# Patient Record
Sex: MALE | Race: WHITE | URBAN - METROPOLITAN AREA
[De-identification: names, ages, dates, MRNs, and addresses within clinical notes are randomized per-mention and may not be internally consistent; named-entity substitution may affect disease eponyms.]

---

## 2017-11-29 ENCOUNTER — OFFICE VISIT (OUTPATIENT)
Dept: FAMILY MEDICINE | Facility: CLINIC | Age: 57
End: 2017-11-29
Payer: OTHER MISCELLANEOUS

## 2017-11-29 VITALS
BODY MASS INDEX: 26.11 KG/M2 | WEIGHT: 197 LBS | HEIGHT: 73 IN | OXYGEN SATURATION: 99 % | HEART RATE: 66 BPM | SYSTOLIC BLOOD PRESSURE: 122 MMHG | DIASTOLIC BLOOD PRESSURE: 76 MMHG | TEMPERATURE: 97.6 F

## 2017-11-29 DIAGNOSIS — L30.9 DERMATITIS: Primary | ICD-10-CM

## 2017-11-29 PROCEDURE — 99202 OFFICE O/P NEW SF 15 MIN: CPT | Performed by: FAMILY MEDICINE

## 2017-11-29 RX ORDER — TRIAMCINOLONE ACETONIDE 5 MG/G
CREAM TOPICAL
Qty: 30 G | Refills: 0 | Status: SHIPPED | OUTPATIENT
Start: 2017-11-29

## 2017-11-29 NOTE — MR AVS SNAPSHOT
"              After Visit Summary   2017    Gray Amin    MRN: [id number]           Patient Information     Date Of Birth          1960        Visit Information        Provider Department      2017 11:20 AM Bridgette Flores MD [facility]        Today's Diagnoses     Dermatitis    -  1      Care Instructions    Take medications as directed.  Skin care  and symptomatic cares discussed   Follow up if problem or concern             Follow-ups after your visit        Who to contact     If you have questions or need follow up information about today's clinic visit or your schedule please contact [facility] directly at [phone].  Normal or non-critical lab and imaging results will be communicated to you by MyChart, letter or phone within 4 business days after the clinic has received the results. If you do not hear from us within 7 days, please contact the clinic through MyChart or phone. If you have a critical or abnormal lab result, we will notify you by phone as soon as possible.  Submit refill requests through [employer] or call your pharmacy and they will forward the refill request to us. Please allow 3 business days for your refill to be completed.          Additional Information About Your Visit        MyChart Information     [employer] lets you send messages to your doctor, view your test results, renew your prescriptions, schedule appointments and more. To sign up, go to [email or web address] . Click on \"Log in\" on the left side of the screen, which will take you to the Welcome page. Then click on \"Sign up Now\" on the right side of the page.     You will be asked to enter the access code listed below, as well as some personal information. Please follow the directions to create your username and password.     Your access code is: [email or web address]  Expires: 2018 11:49 AM     Your access code will  in 90 days. If you need help or a new code, " "please call your [place] clinic or [phone].        Care EveryWhere ID     This is your Care EveryWhere ID. This could be used by other organizations to access your [place] [facility] medical records  [id number]        Your Vitals Were     Pulse Temperature Height Pulse Oximetry BMI (Body Mass Index)       66 97.6  F (36.4  C) (Tympanic) 6' 1\" (1.854 m) 99% 25.99 kg/m2        Blood Pressure from Last 3 Encounters:   11/29/17 122/76    Weight from Last 3 Encounters:   11/29/17 197 lb (89.4 kg)              Today, you had the following     No orders found for display         Today's Medication Changes          These changes are accurate as of: 11/29/17 11:49 AM.  If you have any questions, ask your nurse or doctor.               Start taking these medicines.        Dose/Directions    triamcinolone 0.5 % cream   Commonly known as:  KENALOG   Used for:  Dermatitis   Started by:  Bridgette Flores MD        Apply sparingly to affected area three times daily.   Quantity:  30 g   Refills:  0            Where to get your medicines      These medications were sent to [facility] [place] - [id number] [facility], [place] [address]     Phone:  [phone]     triamcinolone 0.5 % cream                Primary Care Provider Office Phone # Fax #    Bridgette Flores [phone]       [address] [place] [address] [address]  [place] [address]        Equal Access to Services     LOLIS BREWSTER AH: Hadii aad ku hadasho Soomaali, waaxda luqadaha, qaybta kaalmada adeegyada, waxay pranay gibson. So [facility] [phone].    ATENCIÓN: Si donyala español, tiene a julio disposición servicios gratuitos de asistencia lingüística. Llame al [phone].    We comply with applicable federal civil rights laws and [place] laws. We do not discriminate on the basis of race, color, national origin, age, disability, sex, sexual orientation, or gender identity.          "   Thank you!     Thank you for choosing [facility] [place]  for your care. Our goal is always to provide you with excellent care. Hearing back from our patients is one way we can continue to improve our services. Please take a few minutes to complete the written survey that you may receive in the mail after your visit with us. Thank you!             Your Updated Medication List - Protect others around you: Learn how to safely use, store and throw away your medicines at www.disposemymeds.org.          This list is accurate as of: 11/29/17 11:49 AM.  Always use your most recent med list.                   Brand Name Dispense Instructions for use Diagnosis    triamcinolone 0.5 % cream    KENALOG    30 g    Apply sparingly to affected area three times daily.    Dermatitis

## 2017-11-29 NOTE — PROGRESS NOTES
SUBJECTIVE:   Gray Amin is a 57 year old male who presents to clinic today for the following health issues:      Rash      Duration: 3 months     Description  Location: on the top of the hands (both)   Itching: severely   Painful   Red, dry rash     Intensity:  severe    Accompanying signs and symptoms: spreading across hands     History (similar episodes/previous evaluation): None    Precipitating or alleviating factors:  New exposures:  Started working with UV lights a year ago and noticed hand breaking out within last  3-4 months . It did not really bothered him for first 6 months although past 3 months it has become  ongoing problem for him . He hands are under light when he works so he tries to avoid it as much as he can, and he thinks working under light bothers him and hand feel more sensitive now. he also tried hydrocortisone cream but it was burning so he stopped  Using it. So not using  any thing except lotions etc but not helping enough . No rash any where . He had to wash his hands frequently, has to        Wear latex gloves, at work although has been doing that for 30 years, so he does not think that causes problem.   Recent travel: no      Therapies tried and outcome: body lotion, lotion that protects against UV light but not effective         PROBLEMS TO ADD ON...        Problem list and histories reviewed & adjusted, as indicated.  Additional history: as documented    There is no problem list on file for this patient.    No past surgical history on file.    Social History   Substance Use Topics     Smoking status: Current Every Day Smoker     Types: Cigarettes     Smokeless tobacco: Never Used     Alcohol use No     No family history on file.          Reviewed and updated as needed this visit by clinical staff     Reviewed and updated as needed this visit by Provider         ROS:  Constitutional, HEENT, cardiovascular, pulmonary, GI, , musculoskeletal, neuro, skin, endocrine and psych  "systems are negative, except as otherwise noted.      OBJECTIVE:   /76  Pulse 66  Temp 97.6  F (36.4  C) (Tympanic)  Ht 6' 1\" (1.854 m)  Wt 197 lb (89.4 kg)  SpO2 99%  BMI 25.99 kg/m2  Body mass index is 25.99 kg/(m^2).  GENERAL: healthy, alert and no distress  NECK: no adenopathy, no asymmetry, masses, or scars and thyroid normal to palpation  RESP: lungs clear to auscultation - no rales, rhonchi or wheezes  CV: regular rate and rhythm, normal S1 S2, no S3 or S4,   MS: no gross musculoskeletal defects noted, no edema  SKIN: has rash mostly localized to dorsum of  both hands, very erythematous dry thick chapped, cracked with excoriation and slight oozing of blood at few spots ,  No vesicles or blistered etc         ASSESSMENT/PLAN:     (L30.9) Dermatitis  (primary encounter diagnosis)  Comment: hands, chronic, like allergic dermatitis vs eczematous   Plan: triamcinolone (KENALOG) 0.5 % cream    Willing to try topica treatment. Skin cares discussed. Recommend follow up in 3-4 weeks sooner if problem.           Patient expressed understanding and agreement with treatment plan. All patient's questions were answered, will let me know if has more later.  Medications: Rx's: Reviewed the potential side effects/complications of medications prescribed.       Bridgette Flores MD  [facility]  "

## 2017-11-29 NOTE — PATIENT INSTRUCTIONS
Take medications as directed.  Skin care  and symptomatic cares discussed   Follow up if problem or concern

## 2017-11-29 NOTE — NURSING NOTE
"Chief Complaint   Patient presents with     Rash       Initial /76  Pulse 66  Temp 97.6  F (36.4  C) (Tympanic)  Ht 6' 1\" (1.854 m)  Wt 197 lb (89.4 kg)  SpO2 99%  BMI 25.99 kg/m2 Estimated body mass index is 25.99 kg/(m^2) as calculated from the following:    Height as of this encounter: 6' 1\" (1.854 m).    Weight as of this encounter: 197 lb (89.4 kg).  Medication Reconciliation: complete  "